# Patient Record
Sex: FEMALE | Race: WHITE | Employment: OTHER | URBAN - METROPOLITAN AREA
[De-identification: names, ages, dates, MRNs, and addresses within clinical notes are randomized per-mention and may not be internally consistent; named-entity substitution may affect disease eponyms.]

---

## 2017-05-03 ENCOUNTER — HOSPITAL ENCOUNTER (OUTPATIENT)
Dept: MAMMOGRAPHY | Age: 77
Discharge: HOME | End: 2017-05-03
Attending: INTERNAL MEDICINE | Admitting: INTERNAL MEDICINE

## 2017-05-03 DIAGNOSIS — Z12.31 ENCOUNTER FOR SCREENING MAMMOGRAM FOR BREAST CANCER: ICD-10-CM

## 2019-01-17 ENCOUNTER — HOSPITAL ENCOUNTER (OUTPATIENT)
Dept: WOMENS IMAGING | Age: 79
Discharge: HOME | End: 2019-01-17
Payer: MEDICARE

## 2019-01-17 DIAGNOSIS — Z12.31 ENCOUNTER FOR SCREENING MAMMOGRAM FOR BREAST CANCER: ICD-10-CM

## 2019-01-17 PROCEDURE — 77063 BREAST TOMOSYNTHESIS BI: CPT

## 2020-02-05 ENCOUNTER — HOSPITAL ENCOUNTER (OUTPATIENT)
Dept: WOMENS IMAGING | Age: 80
Discharge: HOME | End: 2020-02-05
Payer: MEDICARE

## 2020-02-05 PROCEDURE — 77063 BREAST TOMOSYNTHESIS BI: CPT

## 2021-02-08 ENCOUNTER — HOSPITAL ENCOUNTER (OUTPATIENT)
Dept: WOMENS IMAGING | Age: 81
Discharge: HOME | End: 2021-02-08
Payer: MEDICARE

## 2021-02-08 DIAGNOSIS — Z12.31 VISIT FOR SCREENING MAMMOGRAM: ICD-10-CM

## 2021-02-08 PROCEDURE — 77063 BREAST TOMOSYNTHESIS BI: CPT

## 2021-09-09 ENCOUNTER — HOSPITAL ENCOUNTER (OUTPATIENT)
Dept: GENERAL RADIOLOGY | Age: 81
Discharge: HOME | End: 2021-09-09
Payer: MEDICARE

## 2021-09-09 DIAGNOSIS — R05.9 COUGH: ICD-10-CM

## 2021-09-09 PROCEDURE — 71046 X-RAY EXAM CHEST 2 VIEWS: CPT

## 2021-11-01 ENCOUNTER — APPOINTMENT (OUTPATIENT)
Dept: PHYSICAL THERAPY | Age: 81
End: 2021-11-01
Payer: MEDICARE

## 2021-11-09 ENCOUNTER — HOSPITAL ENCOUNTER (OUTPATIENT)
Dept: PHYSICAL THERAPY | Age: 81
Setting detail: THERAPIES SERIES
Discharge: HOME | End: 2021-11-09
Payer: MEDICARE

## 2021-11-09 PROCEDURE — 97162 PT EVAL MOD COMPLEX 30 MIN: CPT

## 2021-11-09 PROCEDURE — 97140 MANUAL THERAPY 1/> REGIONS: CPT

## 2021-11-09 ASSESSMENT — PAIN SCALES - GENERAL: PAINLEVEL_OUTOF10: 0

## 2021-11-09 NOTE — PROGRESS NOTES
Outpatient Physical Therapy  Phone: [phone] Fax: [phone]     To: Dr. David Reyes     From: Olesya Young PT     Patient: Cedrick Arellano      : 1940  Diagnosis: Hip Pain      Date: 2021  Treatment Diagnosis:      Physical Therapy Certification/Re-Certification Form  Dear Dr. Jack Urbina,  The following patient has been evaluated for physical therapy services and for therapy to continue, Medicare requires monthly physician review of the treatment plan. Please review the attached evaluation and/or summary of the patient's plan of care, and verify that you agree therapy should continue by signing the attached document and sending it back to our office. Plan of Care/Treatment to date:  [x] Therapeutic Exercise   [] Modalities:  [x] Therapeutic Activity     [] Ultrasound  [] Electrical Stimulation   [x] Gait Training      [] Cervical Traction [] Lumbar Traction  [x] Neuromuscular Re-education  [] Hot/Coldpack [] Iontophoresis    [x] Instruction in HEP      Other:  [x] Manual Therapy       []                        [] Aquatic Therapy       []                      ? Frequency/Duration:  # Days per week: [] 1 day # Weeks: [] 1 week [] 5 weeks      [x] 2 days? [] 2 weeks [x] 6 weeks     [] 3 days   [] 3 weeks [] 7 weeks     [] 4 days   [] 4 weeks [] 8 weeks    Rehab Potential: [] Excellent [x] Good [] Fair  [] Poor       Electronically signed by:  Olesya Young PT      If you have any questions or concerns, please don't hesitate to call.   Thank you for your referral.    Physician Signature:________________________________Date:__________________  By signing above, therapists plan is approved by physician

## 2021-11-09 NOTE — PROGRESS NOTES
Physical Therapy  Initial Assessment  Date: 2021  Patient Name: Eduard Thornton  MRN: [id number]  : 1940    Subjective   General  Chart Reviewed: Yes  Patient assessed for rehabilitation services?: Yes  Additional Pertinent Hx: No bone density issues noted. Family / Caregiver Present: No  Referring Practitioner: Yuniel Guevara  Referral Date : 10/06/21  Diagnosis: Hip Pain  Follows Commands: Within Functional Limits  General Comment  Comments: PLOF:  Full functional activity without limits to pain. Currently, 80-85% pLOF. PT Visit Information  PT Insurance Information: Medicare  Subjective  Subjective: Pt reports pain in the R anterior hip/groin. No issues walking but getting in/out of the car is painful. \"Forward/backward no problem, but sideways with the R Leg is painful\". Pain has progressively worsened over the past few months but slightly better the past 1-2 weeks. No issues with the LB. Pt states the main complaint is 'slowness' with transfers especially getting in/out of the car. Up/down steps is painful especially with R LE first.  In/out of bed is good with R LE first.  Lifting the R LE is the most painful action. Increased pain with rolling over on the hip. No problems standing. Sitting is ok but sit/stand is painful.  x-rays of the hip (+) mild arthritis. Denies N/T in the LEs. Weakness noted in the R LE. Feels 'unsteady' with walking and feels it may give out on her. Pain Screening  Patient Currently in Pain: Yes  Pain Assessment  Pain Assessment: 0-10  Pain Level: 0 (at worst 6-7/10)  Vital Signs  Patient Currently in Pain: Yes    Objective     Observation/Palpation  Posture: Good  Palpation: Increased tenderness R iliopsoas. Observation: Standing: Decreased WB on R wtih slight knee flexion. Increased R hip ER,  R pelvic rotation. Sway back with decreased gluteal and core contraction. Body Mechanics: SLS R with trendelenberg, hip IR and pelvic rotation.  Squat test: quad dominance with knee valgus. SLS squat B hip iR, knee valgus, quad dominance, LOB. AMB: B hip IR, knee valgus, trendelenberg, ERS lumbar spine, decreased stride length and femoral amb. PROM RLE (degrees)  RLE PROM: WNL  AROM RLE (degrees)  RLE AROM: WNL  PROM LLE (degrees)  LLE PROM: WNL  AROM LLE (degrees)  LLE AROM : WNL  Spine  Lumbar: Full lumbar flexion, SB, rotation and extension without pain. Joint Mobility  ROM RLE: AGMR R hip joint. Hypomobility R hip joint. Strength RLE  Strength RLE: WNL  Comment: Hip ER 4-/5; Hip IR 4/5; PGM 3-/5  Strength LLE  Strength LLE: WNL  Comment: Hip ER 4-/5; Hip IR 4/5; PGM 3-/5     Additional Measures  Flexibility: R hip IR 5 degrees, L 10 degrees. R hip ER 20 degrees. Decreased hamstring length B. Decreased R hip flexor length  Special Tests: (+) non-relevant R slump test.  Other: AGMR R hip joint. (+) R lesvia test.    Assessment   Conditions Requiring Skilled Therapeutic Intervention  Assessment: Pt presents with movement impairments AGMR R hip and ERS lumbar spine. Significant ROM deficits and decreased gluteal strength in the R hip.   Prognosis: Good  Decision Making: Medium Complexity  REQUIRES PT FOLLOW UP: Yes         Plan   Plan  Times per week: 2x/wk for 6 weeks  Current Treatment Recommendations: Strengthening, Neuromuscular Re-education, Home Exercise Program, ROM, Manual Therapy - Soft Tissue Mobilization, Endurance Training, Patient/Caregiver Education & Training, Manual Therapy - Joint Manipulation, Transfer Training, Gait Training    G-Code  LEFS   49% disability     Goals  Long term goals  Time Frame for Long term goals : 6 weeks  Long term goal 1: Pt independent with HEP  Long term goal 2: Pt gluteal strength to improve by 1/3 muscle grade  Long term goal 3: R hip IR/ER ROM to improve by 50%  Long term goal 4: Pt sit/stand without pain or hip stiffness  Long term goal 5: Pt return to PLOF       Therapy Time   Individual Concurrent Group Co-treatment   Time In  1:45         Time Out  2:30         Minutes  15                 Monique Leija, [address]

## 2021-11-09 NOTE — FLOWSHEET NOTE
[facility]. [facility] Therapy, [facility], [address] [facility], [address] [place] [address]  Phone: [phone]  Fax [phone]    Physical Therapy Daily Treatment Note    Date:  2021    Patient Name:  Trent Beltran    :  1940  MRN: [id number]  Restrictions/Precautions:  Reports no bone density  Medical/Treatment Diagnosis Information:  · Diagnosis: Hip Pain  Insurance/Certification information:  PT Insurance Information: Medicare  Physician Information:  Referring Practitioner: Aissatou Ornelas  Plan of care signed (Y/N):  N  Visit# / total visits:      G-Code (if applicable):          LEFS  49% disability     Medicare Cap (if applicable):  201 = total amount used, updated 2021    Time in:   1:45      Timed Treatment: 15 Total Treatment Time:  45  Time out: 2:30  ________________________________________________________________________________________    Pain Level:    2/10  SUBJECTIVE:  See initial evaluation     OBJECTIVE:     Exercise/Equipment Resistance/Repetitions Other comments          TA sets    NV     Clamshells NV     Prone hip extension NV     Bridging NV     DKTC with feet on TB  NV                                           R hip IR/ER neuro re-ed   NV                                                        Other Therapeutic Activities:      Manual Treatments:  Gr 4 LAD with straps, compression, oscillation. Femoral MWM R hip flexion. R hip hit technique. NV hip IR/ER neuro re-ed resisted training.         Modalities:      Test/Measurements:         ASSESSMENT:         Treatment/Activity Tolerance:   [x]Patient tolerated treatment well [] Patient limited by fatique  []Patient limited by pain [] Patient limited by other medical complications  [] Other:     Goals:        Long term goals  Time Frame for Long term goals : 6 weeks  Long term goal 1: Pt independent with HEP  Long term goal 2: Pt gluteal strength to improve by 1/3 muscle grade  Long term goal 3: R hip IR/ER ROM to improve by 50%  Long term goal 4: Pt sit/stand without pain or hip stiffness  Long term goal 5: Pt return to PLOF     Plan: [x] Continue per plan of care [] Alter current plan (see comments)   [x] Plan of care initiated [] Hold pending MD visit [] Discharge      Plan for Next Session:      Re-Certification Due Date:         Signature:  Tom Sommers PT

## 2021-11-11 ENCOUNTER — HOSPITAL ENCOUNTER (OUTPATIENT)
Dept: PHYSICAL THERAPY | Age: 81
Setting detail: THERAPIES SERIES
Discharge: HOME | End: 2021-11-11
Payer: MEDICARE

## 2021-11-11 PROCEDURE — 97112 NEUROMUSCULAR REEDUCATION: CPT

## 2021-11-11 PROCEDURE — 97140 MANUAL THERAPY 1/> REGIONS: CPT

## 2021-11-11 PROCEDURE — 97110 THERAPEUTIC EXERCISES: CPT

## 2021-11-11 NOTE — FLOWSHEET NOTE
[facility], [facility], [address] [place] [address] Dr  Phone: [phone]  Fax [phone]    Physical Therapy Daily Treatment Note    Date:  2021    Patient Name:  Eloina Rizvi    :  1940  MRN: [id number]  Restrictions/Precautions:  Reports no bone density  Medical/Treatment Diagnosis Information:  · Diagnosis: Hip Pain  Insurance/Certification information:  PT Insurance Information: Medicare  Physician Information:  Referring Practitioner: Jolene Medeiros  Plan of care signed (Y/N):  N  Visit# / total visits:      G-Code (if applicable):          LEFS  49% disability     Medicare Cap (if applicable):  943 = total amount used, updated 2021    Time in:   12:45      Timed Treatment: 15 Total Treatment Time:  45  Time out:  1:30  ________________________________________________________________________________________    Pain Level:    0/10  SUBJECTIVE:   By end of session today, patient able to cross her R leg to don her shoe. \"I havent been able to do that for years'. Pt reports pain in the R anterior hip/groin. No issues walking but getting in/out of the car is painful. \"Forward/backward no problem, but sideways with the R Leg is painful\". Pain has progressively worsened over the past few months but slightly better the past 1-2 weeks. No issues with the LB. Pt states the main complaint is 'slowness' with transfers especially getting in/out of the car. Up/down steps is painful especially with R LE first.  In/out of bed is good with R LE first.  Lifting the R LE is the most painful action. Increased pain with rolling over on the hip. No problems standing. Sitting is ok but sit/stand is painful.  x-rays of the hip (+) mild arthritis. Denies N/T in the LEs. Weakness noted in the R LE. Feels 'unsteady' with walking and feels it may give out on her.     OBJECTIVE:     Exercise/Equipment Resistance/Repetitions Other comments          TA sets BP cuff        With march Until achieved  x10 B HEP video    Clamshells 6x5 secs B   HEP video    Prone hip extension 10x5 secs B  HEP video    Bridging 10x5 secs  HEP video    DKTC with feet on TB  x30                                          B hip IR/ER neuro re-ed   x5 mins B                    TG    x6 mins                                   Other Therapeutic Activities:      Manual Treatments:  Gr 4 LAD with straps, compression, oscillation. Femoral MWM B hip flexion. B hip hit technique. B hip IR/ER neuro re-ed resisted training. Modalities:      Test/Measurements:    R hip IR 5 degrees, L 10 degrees. R hip ER 20 degrees. Decreased hamstring length B. Decreased R hip flexor length  Special Tests: (+) non-relevant R slump test.  Other: AGMR R hip joint. (+) R lesvia test.       ASSESSMENT:   Pt responding well to manual therapy with significant R hip joint mobility. Initiated gluteal and core strengthening today.        Treatment/Activity Tolerance:   [x]Patient tolerated treatment well [] Patient limited by fatique  []Patient limited by pain [] Patient limited by other medical complications  [] Other:     Goals:        Long term goals  Time Frame for Long term goals : 6 weeks  Long term goal 1: Pt independent with HEP  Long term goal 2: Pt gluteal strength to improve by 1/3 muscle grade  Long term goal 3: R hip IR/ER ROM to improve by 50%  Long term goal 4: Pt sit/stand without pain or hip stiffness  Long term goal 5: Pt return to PLOF     Plan: [x] Continue per plan of care [] Alter current plan (see comments)   [] Plan of care initiated [] Hold pending MD visit [] Discharge      Plan for Next Session:      Re-Certification Due Date:         Signature:  Jasmina Limon PT

## 2021-11-17 ENCOUNTER — HOSPITAL ENCOUNTER (OUTPATIENT)
Dept: PHYSICAL THERAPY | Age: 81
Setting detail: THERAPIES SERIES
Discharge: HOME | End: 2021-11-17
Payer: MEDICARE

## 2021-11-17 PROCEDURE — 97140 MANUAL THERAPY 1/> REGIONS: CPT

## 2021-11-17 PROCEDURE — 97110 THERAPEUTIC EXERCISES: CPT

## 2021-11-17 PROCEDURE — 97112 NEUROMUSCULAR REEDUCATION: CPT

## 2021-11-17 NOTE — FLOWSHEET NOTE
[facility]. and Therapy, [address]  Phone: [phone]  Fax [phone]    Physical Therapy Daily Treatment Note    Date:  2021    Patient Name:  Hans Jones    :  1940  MRN: [id number]  Restrictions/Precautions:  Reports no bone density  Medical/Treatment Diagnosis Information:  · Diagnosis: Hip Pain  Insurance/Certification information:  PT Insurance Information: Medicare  Physician Information:  Referring Practitioner: Jamari Torres  Plan of care signed (Y/N):  N  Visit# / total visits:  3/12    G-Code (if applicable):          LEFS  49% disability     Medicare Cap (if applicable):  930 = total amount used, updated 2021    Time in:   11:30      Timed Treatment: 45 Total Treatment Time:  45  Time out:  12:15  ________________________________________________________________________________________    Pain Level:    0/10  SUBJECTIVE:   Pt continues to be able to cross her R leg over the L since beginning therapy. Significant improvement in condition. Pt reports pain in the R anterior hip/groin. No issues walking but getting in/out of the car is painful. \"Forward/backward no problem, but sideways with the R Leg is painful\". Pain has progressively worsened over the past few months but slightly better the past 1-2 weeks. No issues with the LB. Pt states the main complaint is 'slowness' with transfers especially getting in/out of the car. Up/down steps is painful especially with R LE first.  In/out of bed is good with R LE first.  Lifting the R LE is the most painful action. Increased pain with rolling over on the hip. No problems standing. Sitting is ok but sit/stand is painful.  x-rays of the hip (+) mild arthritis. Denies N/T in the LEs. Weakness noted in the R LE. Feels 'unsteady' with walking and feels it may give out on her.     OBJECTIVE:     Exercise/Equipment Resistance/Repetitions Other comments          TA sets BP cuff        With march       With KFO  Until achieved  x10 B  x10 B HEP video    Clamshells x10 B green TB    HEP video    Prone hip extension   HEP video    Bridging 10x5 secs  HEP video    DKTC with feet on TB  x30                  Stacking with MB   NV     Rockerboard  NV            B hip IR/ER neuro re-ed   x5 mins B                    TG    x6 mins                                   Other Therapeutic Activities:      Manual Treatments:  Gr 4 LAD with straps, compression, oscillation. Femoral MWM B hip flexion. R hip hit technique. R hip IR/ER neuro re-ed resisted training. Prone R hip mobilizations. Modalities:      Test/Measurements:    R hip IR 5 degrees, L 10 degrees. R hip ER 20 degrees. Decreased hamstring length B. Decreased R hip flexor length  Special Tests: (+) non-relevant R slump test.  Other: AGMR R hip joint. (+) R lesvia test.       ASSESSMENT:   Pt responding well to manual therapy with significant R hip joint mobility. Initiated gluteal and core strengthening today.        Treatment/Activity Tolerance:   [x]Patient tolerated treatment well [] Patient limited by fatique  []Patient limited by pain [] Patient limited by other medical complications  [] Other:     Goals:        Long term goals  Time Frame for Long term goals : 6 weeks  Long term goal 1: Pt independent with HEP  Long term goal 2: Pt gluteal strength to improve by 1/3 muscle grade  Long term goal 3: R hip IR/ER ROM to improve by 50%  Long term goal 4: Pt sit/stand without pain or hip stiffness  Long term goal 5: Pt return to PLOF     Plan: [x] Continue per plan of care [] Alter current plan (see comments)   [] Plan of care initiated [] Hold pending MD visit [] Discharge      Plan for Next Session:      Re-Certification Due Date:         Signature:  Olesya Young, LITZY

## 2021-11-19 ENCOUNTER — HOSPITAL ENCOUNTER (OUTPATIENT)
Dept: PHYSICAL THERAPY | Age: 81
Setting detail: THERAPIES SERIES
Discharge: HOME | End: 2021-11-19
Payer: MEDICARE

## 2021-11-19 PROCEDURE — 97112 NEUROMUSCULAR REEDUCATION: CPT

## 2021-11-19 PROCEDURE — 97110 THERAPEUTIC EXERCISES: CPT

## 2021-11-19 PROCEDURE — 97140 MANUAL THERAPY 1/> REGIONS: CPT

## 2021-11-19 NOTE — FLOWSHEET NOTE
[facility], [facility], [address] [place] [address], [address] [place] [address] Dr  Phone: [phone]  Fax [phone]    Physical Therapy Daily Treatment Note    Date:  2021    Patient Name:  Maicol Rodas    :  1940  MRN: [id number]  Restrictions/Precautions:  Reports no bone density  Medical/Treatment Diagnosis Information:  · Diagnosis: Hip Pain  Insurance/Certification information:  PT Insurance Information: Medicare  Physician Information:  Referring Practitioner: Renuka Leblanc  Plan of care signed (Y/N):  N  Visit# / total visits:      G-Code (if applicable):          LEFS  49% disability     Medicare Cap (if applicable):  228 = total amount used, updated 2021    Time in:   10:45      Timed Treatment: 45 Total Treatment Time:  45  Time out:  11:30  ________________________________________________________________________________________    Pain Level:    0/10  SUBJECTIVE:   Pt continues to be able to cross her R leg over the L since beginning therapy. Significant improvement in condition. Pt reports pain in the R anterior hip/groin. No issues walking but getting in/out of the car is painful. \"Forward/backward no problem, but sideways with the R Leg is painful\". Pain has progressively worsened over the past few months but slightly better the past 1-2 weeks. No issues with the LB. Pt states the main complaint is 'slowness' with transfers especially getting in/out of the car. Up/down steps is painful especially with R LE first.  In/out of bed is good with R LE first.  Lifting the R LE is the most painful action. Increased pain with rolling over on the hip. No problems standing. Sitting is ok but sit/stand is painful.  x-rays of the hip (+) mild arthritis. Denies N/T in the LEs. Weakness noted in the R LE. Feels 'unsteady' with walking and feels it may give out on her.     OBJECTIVE:     Exercise/Equipment Resistance/Repetitions Other comments          TA sets         With march       With KFO  10x5 secs  HEP video    Clamshells x10 B green TB    HEP video    Prone hip extension   HEP video    Bridging 10x5 secs with green TB   HEP video    DKTC with feet on TB  x30                  Stacking with MB   2x30 secs B   HEP    Rockerboard  x1 min F/B M/L  x1 min rocking each             B hip IR/ER neuro re-ed   x5 mins B                    TG    x6 mins                                   Other Therapeutic Activities:      Manual Treatments:  Gr 4 LAD with straps, compression, oscillation. Gr 3-4 lateral distraction R hip joint MWM. Femoral MWM B hip flexion. R hip hit technique. R hip IR/ER neuro re-ed resisted training. Prone R hip mobilizations. Manual stretching hip flexion and ER position. Modalities:      Test/Measurements:   Supine R hip flexion  140 degrees    R hip IR  15 degrees   R hip ER  45 degrees      Special Tests: (+) non-relevant R slump test.  Other: AGMR R hip joint. (+) R lesvia test.       ASSESSMENT:   Pt responding well to manual therapy with significant R hip joint mobility. Initiated gluteal and core strengthening today.        Treatment/Activity Tolerance:   [x]Patient tolerated treatment well [] Patient limited by fatique  []Patient limited by pain [] Patient limited by other medical complications  [] Other:     Goals:        Long term goals  Time Frame for Long term goals : 6 weeks  Long term goal 1: Pt independent with HEP  Long term goal 2: Pt gluteal strength to improve by 1/3 muscle grade  Long term goal 3: R hip IR/ER ROM to improve by 50%  Long term goal 4: Pt sit/stand without pain or hip stiffness  Long term goal 5: Pt return to PLOF     Plan: [x] Continue per plan of care [] Alter current plan (see comments)   [] Plan of care initiated [] Hold pending MD visit [] Discharge      Plan for Next Session:      Re-Certification Due Date:         Signature:  Carlos Tillman PT

## 2021-11-22 ENCOUNTER — HOSPITAL ENCOUNTER (OUTPATIENT)
Dept: PHYSICAL THERAPY | Age: 81
Setting detail: THERAPIES SERIES
Discharge: HOME | End: 2021-11-22
Payer: MEDICARE

## 2021-11-22 PROCEDURE — 97140 MANUAL THERAPY 1/> REGIONS: CPT

## 2021-11-22 PROCEDURE — 97110 THERAPEUTIC EXERCISES: CPT

## 2021-11-22 PROCEDURE — 97112 NEUROMUSCULAR REEDUCATION: CPT

## 2021-11-22 NOTE — FLOWSHEET NOTE
[facility], [facility], [address], [address]  Phone: [phone]  Fax [phone]    Physical Therapy Daily Treatment Note    Date:  2021    Patient Name:  Robert Ames    :  1940  MRN: [id number]  Restrictions/Precautions:  Reports no bone density  Medical/Treatment Diagnosis Information:  · Diagnosis: Hip Pain  Insurance/Certification information:  PT Insurance Information: Medicare  Physician Information:  Referring Practitioner: Khadijah Zhang  Plan of care signed (Y/N):  N  Visit# / total visits:      G-Code (if applicable):          LEFS  49% disability     Medicare Cap (if applicable):  819 = total amount used, updated 2021    Time in:   1:00      Timed Treatment: 45 Total Treatment Time:  45  Time out:  1:45  ________________________________________________________________________________________    Pain Level:    0/10  SUBJECTIVE:   Pt continues to be able to cross her R leg over the L since beginning therapy. Significant improvement in condition. Pt reports pain in the R anterior hip/groin. No issues walking but getting in/out of the car is painful. \"Forward/backward no problem, but sideways with the R Leg is painful\". Pain has progressively worsened over the past few months but slightly better the past 1-2 weeks. No issues with the LB. Pt states the main complaint is 'slowness' with transfers especially getting in/out of the car. Up/down steps is painful especially with R LE first.  In/out of bed is good with R LE first.  Lifting the R LE is the most painful action. Increased pain with rolling over on the hip. No problems standing. Sitting is ok but sit/stand is painful.  x-rays of the hip (+) mild arthritis. Denies N/T in the LEs. Weakness noted in the R LE. Feels 'unsteady' with walking and feels it may give out on her.     OBJECTIVE:     Exercise/Equipment Resistance/Repetitions Other comments          TA sets         With march       With KFO  10x5 secs  HEP video    Clamshells x10 B green TB alt    HEP    Prone hip extension   HEP video    Bridging 10x5 secs with green TB   HEP video    DKTC with feet on TB      Clamshells 2   6x5 secs B  HEP video           Stacking with MB   2x30 secs B   HEP    Rockerboard  x1 min F/B M/L  x1 min rocking each     Lateral stepping with green TB       Steamboats      15ft x6  x15 B     B hip IR/ER neuro re-ed   x4 mins B             S' extension   x15 B maroon TB     Multifidus  x15 B maroon TB            TG with MWM   x8 mins Manual stretching, wide BEULAH, R leg over L stretching   TG    x6 mins              Other Therapeutic Activities:      Manual Treatments:  Gr 4 LAD with straps, compression, oscillation. Gr 3-4 lateral distraction R hip joint MWM with squatting on TG. Femoral MWM B hip flexion. R hip hit technique. R hip IR/ER neuro re-ed resisted training. Prone R hip mobilizations. Manual stretching hip flexion and ER position. Modalities:      Test/Measurements:   Supine R hip flexion  140 degrees    R hip IR  15 degrees   R hip ER  45 degrees      Special Tests: (+) non-relevant R slump test.  Other: AGMR R hip joint. (+) R lesvia test.       ASSESSMENT:   Pt responding well to manual therapy with significant R hip joint mobility. Initiated gluteal and core strengthening today.        Treatment/Activity Tolerance:   [x]Patient tolerated treatment well [] Patient limited by fatique  []Patient limited by pain [] Patient limited by other medical complications  [] Other:     Goals:        Long term goals  Time Frame for Long term goals : 6 weeks  Long term goal 1: Pt independent with HEP  Long term goal 2: Pt gluteal strength to improve by 1/3 muscle grade  Long term goal 3: R hip IR/ER ROM to improve by 50%  Long term goal 4: Pt sit/stand without pain or hip stiffness  Long term goal 5: Pt return to PLOF Plan: [x] Continue per plan of care [] Alter current plan (see comments)   [] Plan of care initiated [] Hold pending MD visit [] Discharge      Plan for Next Session:      Re-Certification Due Date:         Signature:  Olesya Young PT

## 2021-11-24 ENCOUNTER — HOSPITAL ENCOUNTER (OUTPATIENT)
Dept: PHYSICAL THERAPY | Age: 81
Setting detail: THERAPIES SERIES
Discharge: HOME | End: 2021-11-24
Payer: MEDICARE

## 2021-11-24 PROCEDURE — 97110 THERAPEUTIC EXERCISES: CPT

## 2021-11-24 PROCEDURE — 97112 NEUROMUSCULAR REEDUCATION: CPT

## 2021-11-24 PROCEDURE — 97140 MANUAL THERAPY 1/> REGIONS: CPT

## 2021-11-24 NOTE — FLOWSHEET NOTE
[facility], [facility], [address] [place] [address], [address] [place] [address]  Phone: [phone]  Fax [phone]    Physical Therapy Daily Treatment Note    Date:  2021    Patient Name:  Robert Ames    :  1940  MRN: [id number]  Restrictions/Precautions:  Reports no bone density  Medical/Treatment Diagnosis Information:  · Diagnosis: Hip Pain  Insurance/Certification information:  PT Insurance Information: Medicare  Physician Information:  Referring Practitioner: Khadijah Zhang  Plan of care signed (Y/N):  N  Visit# / total visits:      G-Code (if applicable):          LEFS  49% disability     Medicare Cap (if applicable):  826 = total amount used, updated 2021    Time in:   1:00      Timed Treatment: 45 Total Treatment Time:  45  Time out:  1:45  ________________________________________________________________________________________    Pain Level:    0/10  SUBJECTIVE:   Pt continues to report significant improvement in condition. Pt reports pain in the R anterior hip/groin. No issues walking but getting in/out of the car is painful. \"Forward/backward no problem, but sideways with the R Leg is painful\". Pain has progressively worsened over the past few months but slightly better the past 1-2 weeks. No issues with the LB. Pt states the main complaint is 'slowness' with transfers especially getting in/out of the car. Up/down steps is painful especially with R LE first.  In/out of bed is good with R LE first.  Lifting the R LE is the most painful action. Increased pain with rolling over on the hip. No problems standing. Sitting is ok but sit/stand is painful.  x-rays of the hip (+) mild arthritis. Denies N/T in the LEs. Weakness noted in the R LE. Feels 'unsteady' with walking and feels it may give out on her.     OBJECTIVE:     Exercise/Equipment Resistance/Repetitions Other comments   3 way ball compression 2x30 secs forward  10x5 secs diagonally    TA sets         With march       With KFO    HEP video    Clamshells x10 B green TB alt    HEP    Prone hip extension   HEP video    Bridging 10x5 secs with green TB   HEP video    DKTC with feet on TB      Clamshells 2     HEP video           Stacking with MB   2x30 secs B   HEP    Rockerboard  x1 min F/B M/L  x1 min rocking each     Lateral stepping with green TB       Steamboats      15ft x8  x15 B     B hip IR/ER neuro re-ed   x4 mins B             S' extension   x15 B maroon TB     Multifidus  x15 B maroon TB     Mini squats    x10  Emphasis on form   TG with MWM   x8 mins Manual stretching, wide BEULAH, R leg over L stretching   TG    x6 mins              Other Therapeutic Activities:      Manual Treatments:  Gr 4 LAD with straps, compression, oscillation. Gr 3-4 lateral distraction R hip joint MWM with squatting on TG. Femoral MWM B hip flexion. R hip hit technique. R hip IR/ER neuro re-ed resisted training. Prone R hip mobilizations. Manual stretching hip flexion and ER position. Modalities:      Test/Measurements:   Supine R hip flexion  140 degrees    R hip IR  15 degrees   R hip ER  45 degrees      Special Tests: (+) non-relevant R slump test.  Other: AGMR R hip joint. (+) R lesvia test.       ASSESSMENT:   Pt responding well to manual therapy with significant R hip joint mobility. Progressed gluteal and core strengthening today as well as movement training.        Treatment/Activity Tolerance:   [x]Patient tolerated treatment well [] Patient limited by fatique  []Patient limited by pain [] Patient limited by other medical complications  [] Other:     Goals:        Long term goals  Time Frame for Long term goals : 6 weeks  Long term goal 1: Pt independent with HEP  Long term goal 2: Pt gluteal strength to improve by 1/3 muscle grade  Long term goal 3: R hip IR/ER ROM to improve by 50%  Long term goal 4: Pt sit/stand without pain or hip stiffness  Long term goal 5: Pt return to PLOF     Plan: [x] Continue per plan of care [] Alter current plan (see comments)   [] Plan of care initiated [] Hold pending MD visit [] Discharge      Plan for Next Session:      Re-Certification Due Date:         Signature:  Carlos Tillman PT

## 2021-11-29 ENCOUNTER — HOSPITAL ENCOUNTER (OUTPATIENT)
Dept: PHYSICAL THERAPY | Age: 81
Setting detail: THERAPIES SERIES
Discharge: HOME | End: 2021-11-29
Payer: MEDICARE

## 2021-11-29 PROCEDURE — 97112 NEUROMUSCULAR REEDUCATION: CPT

## 2021-11-29 PROCEDURE — 97140 MANUAL THERAPY 1/> REGIONS: CPT

## 2021-11-29 PROCEDURE — 97110 THERAPEUTIC EXERCISES: CPT

## 2021-11-29 NOTE — FLOWSHEET NOTE
[facility], [address]  Phone: [phone]  Fax [phone]    Physical Therapy Daily Treatment Note    Date:  2021    Patient Name:  Wendy Rivera    :  1940  MRN: [id number]  Restrictions/Precautions:  Reports no bone density  Medical/Treatment Diagnosis Information:  · Diagnosis: Hip Pain  Insurance/Certification information:  PT Insurance Information: Medicare  Physician Information:  Referring Practitioner: Deven Reyes  Plan of care signed (Y/N):  N  Visit# / total visits:      G-Code (if applicable):          LEFS  49% disability     Medicare Cap (if applicable):  887 = total amount used, updated 2021    Time in:   1:00      Timed Treatment: 45 Total Treatment Time:  45  Time out:  1:45  ________________________________________________________________________________________    Pain Level:    0/10  SUBJECTIVE:   Pt continues to report significant improvement in condition. Pt notes doing really well with pain at times. Improvement with getting out of bed and in/out of the car. Pt reports pain in the R anterior hip/groin. No issues walking but getting in/out of the car is painful. \"Forward/backward no problem, but sideways with the R Leg is painful\". Pain has progressively worsened over the past few months but slightly better the past 1-2 weeks. No issues with the LB. Pt states the main complaint is 'slowness' with transfers especially getting in/out of the car. Up/down steps is painful especially with R LE first.  In/out of bed is good with R LE first.  Lifting the R LE is the most painful action. Increased pain with rolling over on the hip. No problems standing. Sitting is ok but sit/stand is painful.  x-rays of the hip (+) mild arthritis. Denies N/T in the LEs. Weakness noted in the R LE.   Feels 'unsteady' with walking and feels it may give out on her.    OBJECTIVE:     Exercise/Equipment Resistance/Repetitions Other comments   3 way ball compression   2x30 secs forward  10x5 secs diagonally    TA sets         With march       With KFO    HEP video    Clamshells     HEP    Prone hip extension   HEP video    Bridging 10x5 secs with green TB   HEP video    DKTC with feet on TB      Clamshells 2     HEP video           Stacking with MB   2x30 secs B   HEP    Rockerboard  x1 min F/B M/L  x1 min rocking each     Lateral stepping with green TB       Steamboats      15ft x8  x15 B     R hip IR/ER neuro re-ed   x2 mins R             S' extension   x15 B maroon TB     Multifidus  x15 B maroon TB     Mini squats    x10  Emphasis on form   TG with MWM   x8 mins Manual stretching, wide BEULAH, R leg over L stretching   TG    x6 mins              Other Therapeutic Activities:      Manual Treatments:  Gr 4 LAD with straps, compression, oscillation. Gr 3-4 lateral distraction R hip joint MWM with squatting on TG. Femoral MWM B hip flexion. R hip hit technique. R hip IR/ER neuro re-ed resisted training. Prone R hip mobilizations. Manual stretching hip flexion and ER position. Modalities:      Test/Measurements:   Supine R hip flexion  140 degrees    R hip IR  15 degrees   R hip ER  45 degrees      Special Tests: (+) non-relevant R slump test.  Other: AGMR R hip joint. (+) R lesvia test.       ASSESSMENT:   Pt responding well to manual therapy with significant R hip joint mobility. Progressed gluteal and core strengthening today as well as movement training.        Treatment/Activity Tolerance:   [x]Patient tolerated treatment well [] Patient limited by fatique  []Patient limited by pain [] Patient limited by other medical complications  [] Other:     Goals:        Long term goals  Time Frame for Long term goals : 6 weeks  Long term goal 1: Pt independent with HEP  Long term goal 2: Pt gluteal strength to improve by 1/3 muscle grade  Long term goal 3: R hip IR/ER ROM to improve by 50%  Long term goal 4: Pt sit/stand without pain or hip stiffness  Long term goal 5: Pt return to PLOF     Plan: [x] Continue per plan of care [] Alter current plan (see comments)   [] Plan of care initiated [] Hold pending MD visit [] Discharge      Plan for Next Session:      Re-Certification Due Date:         Signature:  Bert Mirza PT

## 2021-12-01 ENCOUNTER — APPOINTMENT (OUTPATIENT)
Dept: PHYSICAL THERAPY | Age: 81
End: 2021-12-01
Payer: MEDICARE

## 2021-12-02 ENCOUNTER — HOSPITAL ENCOUNTER (OUTPATIENT)
Dept: PHYSICAL THERAPY | Age: 81
Setting detail: THERAPIES SERIES
Discharge: HOME | End: 2021-12-02
Payer: MEDICARE

## 2021-12-02 PROCEDURE — 97110 THERAPEUTIC EXERCISES: CPT

## 2021-12-02 PROCEDURE — 97140 MANUAL THERAPY 1/> REGIONS: CPT

## 2021-12-02 PROCEDURE — 97112 NEUROMUSCULAR REEDUCATION: CPT

## 2021-12-02 NOTE — FLOWSHEET NOTE
Emma  78. and Therapy, [facility], [address] [place] [address]  Phone: [phone]  Fax [phone]    Physical Therapy Daily Treatment Note    Date:  2021    Patient Name:  Nancy Cullen    :  1940  MRN: [id number]  Restrictions/Precautions:  Reports no bone density  Medical/Treatment Diagnosis Information:  · Diagnosis: Hip Pain  Insurance/Certification information:  PT Insurance Information: Medicare  Physician Information:  Referring Practitioner: Bartolo Blake  Plan of care signed (Y/N):  N  Visit# / total visits:      G-Code (if applicable):          LEFS   21 23% disability    At initial evaluation  49% disability     Medicare Cap (if applicable):  987 = total amount used, updated 2021    Time in:   9:45      Timed Treatment: 45 Total Treatment Time:  45  Time out:  10:30  ________________________________________________________________________________________    Pain Level:    0/10  SUBJECTIVE:   Pt continues to report significant improvement in condition. Improvement with getting out of bed and in/out of the car. When she does get pain it is short lived and minimal intensity. Pt comfortable with reducing PT frequency to 1x/wk. Pt reports pain in the R anterior hip/groin. No issues walking but getting in/out of the car is painful. \"Forward/backward no problem, but sideways with the R Leg is painful\". Pain has progressively worsened over the past few months but slightly better the past 1-2 weeks. No issues with the LB. Pt states the main complaint is 'slowness' with transfers especially getting in/out of the car. Up/down steps is painful especially with R LE first.  In/out of bed is good with R LE first.  Lifting the R LE is the most painful action. Increased pain with rolling over on the hip. No problems standing.   Sitting is ok but sit/stand is painful.  x-rays of the hip (+) mild Long term goals  Time Frame for Long term goals : 6 weeks  Long term goal 1: Pt independent with HEP  (met)  Long term goal 2: Pt gluteal strength to improve by 1/3 muscle grade (improved)  Long term goal 3: R hip IR/ER ROM to improve by 50%  (met)  Long term goal 4: Pt sit/stand without pain or hip stiffness  (met)  Long term goal 5: Pt return to PLOF (met)    Plan: [x] Continue per plan of care [] Alter current plan (see comments)   [] Plan of care initiated [] Hold pending MD visit [] Discharge      Plan for Next Session:      Re-Certification Due Date:         Signature:  Anupam Dey PT

## 2021-12-02 NOTE — PROGRESS NOTES
[facility], [facility][1], [address] [place] [address]  Phone: [phone]  Fax [phone]    Physical Therapy Daily Treatment Note    Date:  2021    Patient Name:  Mahin Gregg    :  1940  MRN: [id number]  Restrictions/Precautions:  Reports no bone density  Medical/Treatment Diagnosis Information:  · Diagnosis: Hip Pain  Insurance/Certification information:  PT Insurance Information: Medicare  Physician Information:  Referring Practitioner: Elicia Moreno  Plan of care signed (Y/N):  N  Visit# / total visits:      G-Code (if applicable):          LEFS   21 23% disability    At initial evaluation  49% disability     Medicare Cap (if applicable):  734 = total amount used, updated 2021    Time in:   9:45      Timed Treatment: 45 Total Treatment Time:  45  Time out:  10:30  ________________________________________________________________________________________    Pain Level:    0/10  SUBJECTIVE:   Pt continues to report significant improvement in condition. Improvement with getting out of bed and in/out of the car. When she does get pain it is short lived and minimal intensity. Pt comfortable with reducing PT frequency to 1x/wk. OBJECTIVE:   Test/Measurements:   Supine R hip flexion  130 degrees    R hip IR  15 degrees   R hip ER  45 degrees      Special Tests: (+) non-relevant R slump test.  Other: AGMR R hip joint. (+) R lesvia test.       ASSESSMENT:   Pt responding well to manual therapy with significant R hip joint mobility. Progressed gluteal and core strengthening today as well as movement training. Recommend reducing PT frequency to 1x/wk for the remainder of prescription through December.       Treatment/Activity Tolerance:   [x]Patient tolerated treatment well [] Patient limited by fatique  []Patient limited by pain [] Patient limited by other medical complications  [] Other: Goals:        Long term goals  Time Frame for Long term goals : 6 weeks  Long term goal 1: Pt independent with HEP  (met)  Long term goal 2: Pt gluteal strength to improve by 1/3 muscle grade (improved)  Long term goal 3: R hip IR/ER ROM to improve by 50%  (met)  Long term goal 4: Pt sit/stand without pain or hip stiffness  (met)  Long term goal 5: Pt return to PLOF (met)    Plan: [x] Continue per plan of care [] Alter current plan (see comments)   [] Plan of care initiated [] Hold pending MD visit [] Discharge      Plan for Next Session:      Re-Certification Due Date:         Signature:  Do David, PT

## 2021-12-06 ENCOUNTER — HOSPITAL ENCOUNTER (OUTPATIENT)
Dept: PHYSICAL THERAPY | Age: 81
Setting detail: THERAPIES SERIES
Discharge: HOME | End: 2021-12-06
Payer: MEDICARE

## 2021-12-06 NOTE — PROGRESS NOTES
Physical Therapy  Cancellation/No-show Note  Patient Name:  Jenna Thayer  :  1940   Date:  2021  Cancels to date: 1  No-shows to date: 0    For today's appointment patient:  [] Cancelled  [] Rescheduled appointment  [] No-show     Reason given by patient:  [] Patient ill  [] Conflicting appointment  [] No transportation    [] Conflict with work  [] No reason given  [] Other:     Comments:      Electronically signed by:  Rustam Chong PT

## 2021-12-08 ENCOUNTER — APPOINTMENT (OUTPATIENT)
Dept: PHYSICAL THERAPY | Age: 81
End: 2021-12-08
Payer: MEDICARE

## 2021-12-22 ENCOUNTER — HOSPITAL ENCOUNTER (OUTPATIENT)
Dept: PHYSICAL THERAPY | Age: 81
Setting detail: THERAPIES SERIES
Discharge: HOME | End: 2021-12-22
Payer: MEDICARE

## 2021-12-22 PROCEDURE — 97112 NEUROMUSCULAR REEDUCATION: CPT

## 2021-12-22 PROCEDURE — 97110 THERAPEUTIC EXERCISES: CPT

## 2021-12-22 PROCEDURE — 97140 MANUAL THERAPY 1/> REGIONS: CPT

## 2021-12-22 NOTE — FLOWSHEET NOTE
Emma  78. and Therapy, [facility], [address] [facility], [address]  Phone: [phone]  Fax [phone]    Physical Therapy Daily Treatment Note    Date:  2021    Patient Name:  Dez Cottrell    :  1940  MRN: [id number]  Restrictions/Precautions:  Reports no bone density  Medical/Treatment Diagnosis Information:  · Diagnosis: Hip Pain  Insurance/Certification information:  PT Insurance Information: Medicare  Physician Information:  Referring Practitioner: Jayshree Cotto  Plan of care signed (Y/N):  N  Visit# / total visits:      G-Code (if applicable):          LEFS   21 23% disability    At initial evaluation  49% disability     Medicare Cap (if applicable):  070 = total amount used, updated 2021    Time in:  1:55      Timed Treatment: 35 Total Treatment Time:  35  Time out:  2:30  ________________________________________________________________________________________    Pain Level:    0/10  SUBJECTIVE:   Pt continues to report significant improvement in condition. Yesterday, R hip 'was giving me a fit'. \"Once I sit down it feels good pretty quickly'. Pt reports pain in the R anterior hip/groin. No issues walking but getting in/out of the car is painful. \"Forward/backward no problem, but sideways with the R Leg is painful\". Pain has progressively worsened over the past few months but slightly better the past 1-2 weeks. No issues with the LB. Pt states the main complaint is 'slowness' with transfers especially getting in/out of the car. Up/down steps is painful especially with R LE first.  In/out of bed is good with R LE first.  Lifting the R LE is the most painful action. Increased pain with rolling over on the hip. No problems standing. Sitting is ok but sit/stand is painful.  x-rays of the hip (+) mild arthritis. Denies N/T in the LEs. Weakness noted in the R LE.   Feels 'unsteady' with walking and feels it may give out on her. OBJECTIVE:     Exercise/Equipment Resistance/Repetitions Other comments   3 way ball compression   2x30 secs forward  10x5 secs diagonally    TA sets         With march       With KFO    HEP video    Clamshells     HEP    Prone hip extension 10x5 secs B HEP video    Bridging 10x5 secs with green TB   HEP video    DKTC with feet on TB      Clamshells 2     HEP video           Stacking with MB   2x30 secs B   HEP    Rockerboard  x1 min F/B M/L  x1 min rocking each     Lateral stepping with green TB       Steamboats      15ft x8  x15 B     R hip IR/ER neuro re-ed   x2 mins R             S' extension   x15 B maroon TB     Multifidus  x15 B maroon TB     Mini squats with green TB    x15  Emphasis on form   TG with MWM    Manual stretching, wide BEULAH, R leg over L stretching   TG    x6 mins              Other Therapeutic Activities:      Manual Treatments:  Gr 4 LAD with straps, compression, oscillation. Gr 3-4 lateral distraction R hip joint MWM. Femoral MWM B hip flexion. R hip hit technique. R hip IR/ER neuro re-ed resisted training. Prone R hip mobilizations. Manual stretching hip flexion and ER position. Modalities:      Test/Measurements:   Supine R hip flexion  140 degrees    R hip IR  15 degrees   R hip ER  45 degrees      Special Tests: (+) non-relevant R slump test.  Other: AGMR R hip joint. (+) R lesvia test.       ASSESSMENT:   Pt responding well to manual therapy with significant R hip joint mobility. Progressed gluteal and core strengthening today as well as movement training.         Treatment/Activity Tolerance:   [x]Patient tolerated treatment well [] Patient limited by fatique  []Patient limited by pain [] Patient limited by other medical complications  [] Other:     Goals:        Long term goals  Time Frame for Long term goals : 6 weeks  Long term goal 1: Pt independent with HEP  (met)  Long term goal 2: Pt gluteal strength to improve by 1/3 muscle grade (improved)  Long term goal 3: R hip IR/ER ROM to improve by 50%  (met)  Long term goal 4: Pt sit/stand without pain or hip stiffness  (met)  Long term goal 5: Pt return to PLOF (met)    Plan: [x] Continue per plan of care [] Alter current plan (see comments)   [] Plan of care initiated [] Hold pending MD visit [] Discharge      Plan for Next Session:      Re-Certification Due Date:         Signature:  Humphrey Pulido PT

## 2022-02-09 ENCOUNTER — HOSPITAL ENCOUNTER (OUTPATIENT)
Dept: WOMENS IMAGING | Age: 82
Discharge: HOME | End: 2022-02-09
Payer: MEDICARE

## 2022-02-09 VITALS — BODY MASS INDEX: 22.97 KG/M2 | WEIGHT: 117 LBS | HEIGHT: 60 IN

## 2022-02-09 DIAGNOSIS — Z12.31 ENCOUNTER FOR SCREENING MAMMOGRAM FOR BREAST CANCER: ICD-10-CM

## 2022-02-09 PROCEDURE — 77063 BREAST TOMOSYNTHESIS BI: CPT

## 2022-08-22 ENCOUNTER — HOSPITAL ENCOUNTER (OUTPATIENT)
Dept: WOMENS IMAGING | Age: 82
Discharge: HOME | End: 2022-08-22
Payer: MEDICARE

## 2022-08-22 DIAGNOSIS — M81.0 POSTMENOPAUSAL OSTEOPOROSIS: ICD-10-CM

## 2022-08-22 PROCEDURE — 77080 DXA BONE DENSITY AXIAL: CPT

## 2023-02-13 ENCOUNTER — HOSPITAL ENCOUNTER (OUTPATIENT)
Dept: WOMENS IMAGING | Age: 83
Discharge: HOME | End: 2023-02-13
Payer: MEDICARE

## 2023-02-13 VITALS — HEIGHT: 60 IN | BODY MASS INDEX: 22.78 KG/M2 | WEIGHT: 116 LBS

## 2023-02-13 DIAGNOSIS — Z12.31 VISIT FOR SCREENING MAMMOGRAM: ICD-10-CM

## 2023-02-13 PROCEDURE — 77063 BREAST TOMOSYNTHESIS BI: CPT

## 2024-02-15 ENCOUNTER — HOSPITAL ENCOUNTER (OUTPATIENT)
Dept: WOMENS IMAGING | Age: 84
Discharge: HOME | End: 2024-02-15
Payer: MEDICARE

## 2024-02-15 VITALS — WEIGHT: 115 LBS | HEIGHT: 60 IN | BODY MASS INDEX: 22.58 KG/M2

## 2024-02-15 DIAGNOSIS — Z12.31 ENCOUNTER FOR SCREENING MAMMOGRAM FOR MALIGNANT NEOPLASM OF BREAST: ICD-10-CM

## 2024-02-15 PROCEDURE — 77063 BREAST TOMOSYNTHESIS BI: CPT

## 2024-03-20 ENCOUNTER — HOSPITAL ENCOUNTER (OUTPATIENT)
Age: 84
Discharge: HOME | End: 2024-03-20
Payer: MEDICARE

## 2024-03-20 ENCOUNTER — HOSPITAL ENCOUNTER (OUTPATIENT)
Dept: GENERAL RADIOLOGY | Age: 84
Discharge: HOME | End: 2024-03-20
Payer: MEDICARE

## 2024-03-20 DIAGNOSIS — M25.511 RIGHT SHOULDER PAIN, UNSPECIFIED CHRONICITY: ICD-10-CM

## 2024-03-20 DIAGNOSIS — M79.641 RIGHT HAND PAIN: ICD-10-CM

## 2024-03-20 PROCEDURE — 73030 X-RAY EXAM OF SHOULDER: CPT

## 2024-03-20 PROCEDURE — 73130 X-RAY EXAM OF HAND: CPT

## 2025-03-26 ENCOUNTER — HOSPITAL ENCOUNTER (OUTPATIENT)
Dept: WOMENS IMAGING | Age: 85
Discharge: HOME | End: 2025-03-26
Payer: MEDICARE

## 2025-03-26 VITALS — HEIGHT: 60 IN | BODY MASS INDEX: 22.58 KG/M2 | WEIGHT: 115 LBS

## 2025-03-26 DIAGNOSIS — Z12.31 SCREENING MAMMOGRAM FOR BREAST CANCER: ICD-10-CM

## 2025-03-26 PROCEDURE — 77063 BREAST TOMOSYNTHESIS BI: CPT

## 2025-08-30 ENCOUNTER — APPOINTMENT (OUTPATIENT)
Dept: CT IMAGING | Age: 85
DRG: 641 | End: 2025-08-30
Payer: MEDICARE

## 2025-08-30 ENCOUNTER — HOSPITAL ENCOUNTER (INPATIENT)
Age: 85
LOS: 1 days | Discharge: HOME | DRG: 641 | End: 2025-08-31
Attending: EMERGENCY MEDICINE | Admitting: INTERNAL MEDICINE
Payer: MEDICARE

## 2025-08-30 DIAGNOSIS — R55 PRE-SYNCOPE: Primary | ICD-10-CM

## 2025-08-30 DIAGNOSIS — E87.6 HYPOKALEMIA: ICD-10-CM

## 2025-08-30 DIAGNOSIS — E87.1 HYPONATREMIA: ICD-10-CM

## 2025-08-30 DIAGNOSIS — E83.42 HYPOMAGNESEMIA: ICD-10-CM

## 2025-08-30 DIAGNOSIS — E83.39 HYPOPHOSPHATEMIA: ICD-10-CM

## 2025-08-30 LAB
ANION GAP SERPL CALCULATED.3IONS-SCNC: 14 MMOL/L (ref 3–16)
ANION GAP SERPL CALCULATED.3IONS-SCNC: 14 MMOL/L (ref 3–16)
BASOPHILS # BLD: 0.1 K/UL (ref 0–0.2)
BASOPHILS NFR BLD: 0.7 %
BILIRUB UR QL STRIP.AUTO: NEGATIVE
BUN SERPL-MCNC: 12 MG/DL (ref 7–20)
BUN SERPL-MCNC: 12 MG/DL (ref 7–20)
CALCIUM SERPL-MCNC: 9.3 MG/DL (ref 8.3–10.6)
CALCIUM SERPL-MCNC: 9.4 MG/DL (ref 8.3–10.6)
CHLORIDE SERPL-SCNC: 84 MMOL/L (ref 99–110)
CHLORIDE SERPL-SCNC: 85 MMOL/L (ref 99–110)
CLARITY UR: CLEAR
CO2 SERPL-SCNC: 25 MMOL/L (ref 21–32)
CO2 SERPL-SCNC: 26 MMOL/L (ref 21–32)
COLOR UR: ABNORMAL
CREAT SERPL-MCNC: 0.7 MG/DL (ref 0.6–1.2)
CREAT SERPL-MCNC: 0.7 MG/DL (ref 0.6–1.2)
DEPRECATED RDW RBC AUTO: 12.6 % (ref 12.4–15.4)
EOSINOPHIL # BLD: 0.1 K/UL (ref 0–0.6)
EOSINOPHIL NFR BLD: 0.7 %
EPI CELLS #/AREA URNS HPF: NORMAL /HPF (ref 0–5)
GFR SERPLBLD CREATININE-BSD FMLA CKD-EPI: 84 ML/MIN/{1.73_M2}
GFR SERPLBLD CREATININE-BSD FMLA CKD-EPI: 84 ML/MIN/{1.73_M2}
GLUCOSE SERPL-MCNC: 119 MG/DL (ref 70–99)
GLUCOSE SERPL-MCNC: 126 MG/DL (ref 70–99)
GLUCOSE UR STRIP.AUTO-MCNC: NEGATIVE MG/DL
HCT VFR BLD AUTO: 36.1 % (ref 36–48)
HGB BLD-MCNC: 12.8 G/DL (ref 12–16)
HGB UR QL STRIP.AUTO: NEGATIVE
KETONES UR STRIP.AUTO-MCNC: ABNORMAL MG/DL
LEUKOCYTE ESTERASE UR QL STRIP.AUTO: ABNORMAL
LYMPHOCYTES # BLD: 0.8 K/UL (ref 1–5.1)
LYMPHOCYTES NFR BLD: 10.9 %
MAGNESIUM SERPL-MCNC: 1.73 MG/DL (ref 1.8–2.4)
MCH RBC QN AUTO: 31.9 PG (ref 26–34)
MCHC RBC AUTO-ENTMCNC: 35.6 G/DL (ref 31–36)
MCV RBC AUTO: 89.6 FL (ref 80–100)
MONOCYTES # BLD: 0.7 K/UL (ref 0–1.3)
MONOCYTES NFR BLD: 8.8 %
NEUTROPHILS # BLD: 6 K/UL (ref 1.7–7.7)
NEUTROPHILS NFR BLD: 78.9 %
NITRITE UR QL STRIP.AUTO: NEGATIVE
PH UR STRIP.AUTO: 7.5 [PH] (ref 5–8)
PLATELET # BLD AUTO: 247 K/UL (ref 135–450)
PMV BLD AUTO: 8.4 FL (ref 5–10.5)
POTASSIUM SERPL-SCNC: 2.9 MMOL/L (ref 3.5–5.1)
POTASSIUM SERPL-SCNC: 3 MMOL/L (ref 3.5–5.1)
PROT UR STRIP.AUTO-MCNC: NEGATIVE MG/DL
RBC # BLD AUTO: 4.02 M/UL (ref 4–5.2)
RBC #/AREA URNS HPF: NORMAL /HPF (ref 0–4)
SODIUM SERPL-SCNC: 124 MMOL/L (ref 136–145)
SODIUM SERPL-SCNC: 124 MMOL/L (ref 136–145)
SODIUM UR-SCNC: 58 MMOL/L
SP GR UR STRIP.AUTO: <=1.005 (ref 1–1.03)
TROPONIN, HIGH SENSITIVITY: 13 NG/L (ref 0–14)
TROPONIN, HIGH SENSITIVITY: 13 NG/L (ref 0–14)
UA DIPSTICK W REFLEX MICRO PNL UR: YES
URN SPEC COLLECT METH UR: ABNORMAL
UROBILINOGEN UR STRIP-ACNC: 0.2 E.U./DL
WBC # BLD AUTO: 7.6 K/UL (ref 4–11)
WBC #/AREA URNS HPF: NORMAL /HPF (ref 0–5)

## 2025-08-30 PROCEDURE — 36415 COLL VENOUS BLD VENIPUNCTURE: CPT

## 2025-08-30 PROCEDURE — 81001 URINALYSIS AUTO W/SCOPE: CPT

## 2025-08-30 PROCEDURE — 96365 THER/PROPH/DIAG IV INF INIT: CPT

## 2025-08-30 PROCEDURE — 2580000003 HC RX 258: Performed by: EMERGENCY MEDICINE

## 2025-08-30 PROCEDURE — 99285 EMERGENCY DEPT VISIT HI MDM: CPT

## 2025-08-30 PROCEDURE — 6360000002 HC RX W HCPCS: Performed by: INTERNAL MEDICINE

## 2025-08-30 PROCEDURE — 85025 COMPLETE CBC W/AUTO DIFF WBC: CPT

## 2025-08-30 PROCEDURE — 6370000000 HC RX 637 (ALT 250 FOR IP): Performed by: INTERNAL MEDICINE

## 2025-08-30 PROCEDURE — 70450 CT HEAD/BRAIN W/O DYE: CPT

## 2025-08-30 PROCEDURE — 2060000000 HC ICU INTERMEDIATE R&B

## 2025-08-30 PROCEDURE — 84484 ASSAY OF TROPONIN QUANT: CPT

## 2025-08-30 PROCEDURE — 84550 ASSAY OF BLOOD/URIC ACID: CPT

## 2025-08-30 PROCEDURE — 83935 ASSAY OF URINE OSMOLALITY: CPT

## 2025-08-30 PROCEDURE — 83735 ASSAY OF MAGNESIUM: CPT

## 2025-08-30 PROCEDURE — 6360000002 HC RX W HCPCS: Performed by: EMERGENCY MEDICINE

## 2025-08-30 PROCEDURE — 2580000003 HC RX 258: Performed by: INTERNAL MEDICINE

## 2025-08-30 PROCEDURE — 93005 ELECTROCARDIOGRAM TRACING: CPT | Performed by: EMERGENCY MEDICINE

## 2025-08-30 PROCEDURE — 80048 BASIC METABOLIC PNL TOTAL CA: CPT

## 2025-08-30 PROCEDURE — 2500000003 HC RX 250 WO HCPCS: Performed by: INTERNAL MEDICINE

## 2025-08-30 PROCEDURE — 84300 ASSAY OF URINE SODIUM: CPT

## 2025-08-30 RX ORDER — MAGNESIUM SULFATE IN WATER 40 MG/ML
2000 INJECTION, SOLUTION INTRAVENOUS ONCE
Status: COMPLETED | OUTPATIENT
Start: 2025-08-30 | End: 2025-08-30

## 2025-08-30 RX ORDER — ENOXAPARIN SODIUM 100 MG/ML
40 INJECTION SUBCUTANEOUS DAILY
Status: DISCONTINUED | OUTPATIENT
Start: 2025-08-30 | End: 2025-08-31 | Stop reason: HOSPADM

## 2025-08-30 RX ORDER — ONDANSETRON 2 MG/ML
4 INJECTION INTRAMUSCULAR; INTRAVENOUS EVERY 6 HOURS PRN
Status: DISCONTINUED | OUTPATIENT
Start: 2025-08-30 | End: 2025-08-31 | Stop reason: HOSPADM

## 2025-08-30 RX ORDER — SODIUM CHLORIDE AND POTASSIUM CHLORIDE 300; 900 MG/100ML; MG/100ML
INJECTION, SOLUTION INTRAVENOUS CONTINUOUS
Status: DISCONTINUED | OUTPATIENT
Start: 2025-08-30 | End: 2025-08-30

## 2025-08-30 RX ORDER — ACETAMINOPHEN 650 MG/1
650 SUPPOSITORY RECTAL EVERY 6 HOURS PRN
Status: DISCONTINUED | OUTPATIENT
Start: 2025-08-30 | End: 2025-08-31 | Stop reason: HOSPADM

## 2025-08-30 RX ORDER — SODIUM CHLORIDE 0.9 % (FLUSH) 0.9 %
5-40 SYRINGE (ML) INJECTION PRN
Status: DISCONTINUED | OUTPATIENT
Start: 2025-08-30 | End: 2025-08-31 | Stop reason: HOSPADM

## 2025-08-30 RX ORDER — SODIUM CHLORIDE 0.9 % (FLUSH) 0.9 %
5-40 SYRINGE (ML) INJECTION EVERY 12 HOURS SCHEDULED
Status: DISCONTINUED | OUTPATIENT
Start: 2025-08-30 | End: 2025-08-31 | Stop reason: HOSPADM

## 2025-08-30 RX ORDER — SODIUM CHLORIDE 9 MG/ML
INJECTION, SOLUTION INTRAVENOUS PRN
Status: DISCONTINUED | OUTPATIENT
Start: 2025-08-30 | End: 2025-08-31 | Stop reason: HOSPADM

## 2025-08-30 RX ORDER — MAGNESIUM SULFATE IN WATER 40 MG/ML
2000 INJECTION, SOLUTION INTRAVENOUS PRN
Status: DISCONTINUED | OUTPATIENT
Start: 2025-08-30 | End: 2025-08-31 | Stop reason: HOSPADM

## 2025-08-30 RX ORDER — LANOLIN ALCOHOL/MO/W.PET/CERES
400 CREAM (GRAM) TOPICAL DAILY
Status: DISCONTINUED | OUTPATIENT
Start: 2025-08-30 | End: 2025-08-31

## 2025-08-30 RX ORDER — AMLODIPINE BESYLATE 5 MG/1
5 TABLET ORAL DAILY
COMMUNITY
Start: 2025-08-18

## 2025-08-30 RX ORDER — AMLODIPINE BESYLATE 5 MG/1
5 TABLET ORAL DAILY
Status: DISCONTINUED | OUTPATIENT
Start: 2025-08-31 | End: 2025-08-31 | Stop reason: HOSPADM

## 2025-08-30 RX ORDER — POTASSIUM CHLORIDE 1500 MG/1
40 TABLET, EXTENDED RELEASE ORAL PRN
Status: DISCONTINUED | OUTPATIENT
Start: 2025-08-30 | End: 2025-08-31 | Stop reason: HOSPADM

## 2025-08-30 RX ORDER — POTASSIUM CHLORIDE 1500 MG/1
40 TABLET, EXTENDED RELEASE ORAL ONCE
Status: DISCONTINUED | OUTPATIENT
Start: 2025-08-30 | End: 2025-08-31

## 2025-08-30 RX ORDER — 0.9 % SODIUM CHLORIDE 0.9 %
500 INTRAVENOUS SOLUTION INTRAVENOUS ONCE
Status: COMPLETED | OUTPATIENT
Start: 2025-08-30 | End: 2025-08-30

## 2025-08-30 RX ORDER — ACETAMINOPHEN 325 MG/1
650 TABLET ORAL EVERY 6 HOURS PRN
Status: DISCONTINUED | OUTPATIENT
Start: 2025-08-30 | End: 2025-08-31 | Stop reason: HOSPADM

## 2025-08-30 RX ORDER — ONDANSETRON 4 MG/1
4 TABLET, ORALLY DISINTEGRATING ORAL EVERY 8 HOURS PRN
Status: DISCONTINUED | OUTPATIENT
Start: 2025-08-30 | End: 2025-08-31 | Stop reason: HOSPADM

## 2025-08-30 RX ORDER — POTASSIUM CHLORIDE 7.45 MG/ML
10 INJECTION INTRAVENOUS PRN
Status: DISCONTINUED | OUTPATIENT
Start: 2025-08-30 | End: 2025-08-31 | Stop reason: HOSPADM

## 2025-08-30 RX ORDER — ATENOLOL 25 MG/1
25 TABLET ORAL DAILY
Status: DISCONTINUED | OUTPATIENT
Start: 2025-08-31 | End: 2025-08-31 | Stop reason: HOSPADM

## 2025-08-30 RX ORDER — POLYETHYLENE GLYCOL 3350 17 G/17G
17 POWDER, FOR SOLUTION ORAL DAILY PRN
Status: DISCONTINUED | OUTPATIENT
Start: 2025-08-30 | End: 2025-08-31 | Stop reason: HOSPADM

## 2025-08-30 RX ADMIN — POTASSIUM CHLORIDE 10 MEQ: 7.46 INJECTION, SOLUTION INTRAVENOUS at 22:51

## 2025-08-30 RX ADMIN — POTASSIUM CHLORIDE 10 MEQ: 7.46 INJECTION, SOLUTION INTRAVENOUS at 19:02

## 2025-08-30 RX ADMIN — POTASSIUM CHLORIDE 10 MEQ: 7.46 INJECTION, SOLUTION INTRAVENOUS at 20:14

## 2025-08-30 RX ADMIN — Medication 400 MG: at 19:04

## 2025-08-30 RX ADMIN — Medication 10 ML: at 18:58

## 2025-08-30 RX ADMIN — MAGNESIUM SULFATE HEPTAHYDRATE 2000 MG: 40 INJECTION, SOLUTION INTRAVENOUS at 19:04

## 2025-08-30 RX ADMIN — Medication 10 ML: at 21:44

## 2025-08-30 RX ADMIN — SODIUM CHLORIDE: 0.9 INJECTION, SOLUTION INTRAVENOUS at 19:01

## 2025-08-30 RX ADMIN — SODIUM CHLORIDE AND POTASSIUM CHLORIDE: 9; 2.98 INJECTION, SOLUTION INTRAVENOUS at 15:50

## 2025-08-30 RX ADMIN — POTASSIUM CHLORIDE 10 MEQ: 7.46 INJECTION, SOLUTION INTRAVENOUS at 21:30

## 2025-08-30 RX ADMIN — SODIUM CHLORIDE 500 ML: 0.9 INJECTION, SOLUTION INTRAVENOUS at 15:50

## 2025-08-30 ASSESSMENT — LIFESTYLE VARIABLES
HOW OFTEN DO YOU HAVE A DRINK CONTAINING ALCOHOL: NEVER
HOW MANY STANDARD DRINKS CONTAINING ALCOHOL DO YOU HAVE ON A TYPICAL DAY: PATIENT DOES NOT DRINK

## 2025-08-30 ASSESSMENT — PAIN SCALES - GENERAL: PAINLEVEL_OUTOF10: 0

## 2025-08-30 ASSESSMENT — PAIN - FUNCTIONAL ASSESSMENT: PAIN_FUNCTIONAL_ASSESSMENT: 0-10

## 2025-08-31 VITALS
HEART RATE: 62 BPM | DIASTOLIC BLOOD PRESSURE: 42 MMHG | OXYGEN SATURATION: 100 % | BODY MASS INDEX: 22.32 KG/M2 | TEMPERATURE: 97 F | SYSTOLIC BLOOD PRESSURE: 126 MMHG | RESPIRATION RATE: 16 BRPM | HEIGHT: 60 IN | WEIGHT: 113.7 LBS

## 2025-08-31 LAB
ANION GAP SERPL CALCULATED.3IONS-SCNC: 11 MMOL/L (ref 3–16)
ANION GAP SERPL CALCULATED.3IONS-SCNC: 11 MMOL/L (ref 3–16)
ANION GAP SERPL CALCULATED.3IONS-SCNC: 12 MMOL/L (ref 3–16)
BASOPHILS # BLD: 0.1 K/UL (ref 0–0.2)
BASOPHILS NFR BLD: 0.9 %
BUN SERPL-MCNC: 10 MG/DL (ref 7–20)
BUN SERPL-MCNC: 11 MG/DL (ref 7–20)
BUN SERPL-MCNC: 9 MG/DL (ref 7–20)
CALCIUM SERPL-MCNC: 9.5 MG/DL (ref 8.3–10.6)
CALCIUM SERPL-MCNC: 9.6 MG/DL (ref 8.3–10.6)
CALCIUM SERPL-MCNC: 9.7 MG/DL (ref 8.3–10.6)
CHLORIDE SERPL-SCNC: 90 MMOL/L (ref 99–110)
CHLORIDE SERPL-SCNC: 91 MMOL/L (ref 99–110)
CHLORIDE SERPL-SCNC: 95 MMOL/L (ref 99–110)
CO2 SERPL-SCNC: 26 MMOL/L (ref 21–32)
CO2 SERPL-SCNC: 28 MMOL/L (ref 21–32)
CO2 SERPL-SCNC: 28 MMOL/L (ref 21–32)
CREAT SERPL-MCNC: 0.6 MG/DL (ref 0.6–1.2)
DEPRECATED RDW RBC AUTO: 12.5 % (ref 12.4–15.4)
EKG ATRIAL RATE: 65 BPM
EKG DIAGNOSIS: NORMAL
EKG P AXIS: 79 DEGREES
EKG P-R INTERVAL: 162 MS
EKG Q-T INTERVAL: 454 MS
EKG QRS DURATION: 86 MS
EKG QTC CALCULATION (BAZETT): 472 MS
EKG R AXIS: 30 DEGREES
EKG T AXIS: 70 DEGREES
EKG VENTRICULAR RATE: 65 BPM
EOSINOPHIL # BLD: 0.2 K/UL (ref 0–0.6)
EOSINOPHIL NFR BLD: 2.3 %
GFR SERPLBLD CREATININE-BSD FMLA CKD-EPI: 87 ML/MIN/{1.73_M2}
GLUCOSE SERPL-MCNC: 100 MG/DL (ref 70–99)
GLUCOSE SERPL-MCNC: 104 MG/DL (ref 70–99)
GLUCOSE SERPL-MCNC: 83 MG/DL (ref 70–99)
HCT VFR BLD AUTO: 37.7 % (ref 36–48)
HGB BLD-MCNC: 13.4 G/DL (ref 12–16)
LYMPHOCYTES # BLD: 1.3 K/UL (ref 1–5.1)
LYMPHOCYTES NFR BLD: 19.1 %
MAGNESIUM SERPL-MCNC: 2.59 MG/DL (ref 1.8–2.4)
MCH RBC QN AUTO: 32 PG (ref 26–34)
MCHC RBC AUTO-ENTMCNC: 35.4 G/DL (ref 31–36)
MCV RBC AUTO: 90.3 FL (ref 80–100)
MONOCYTES # BLD: 0.8 K/UL (ref 0–1.3)
MONOCYTES NFR BLD: 11.5 %
NEUTROPHILS # BLD: 4.5 K/UL (ref 1.7–7.7)
NEUTROPHILS NFR BLD: 66.2 %
OSMOLALITY SERPL: 282 MOSM/KG (ref 280–301)
OSMOLALITY UR: 160 MOSM/KG (ref 390–1070)
PHOSPHATE SERPL-MCNC: 2.2 MG/DL (ref 2.5–4.9)
PLATELET # BLD AUTO: 242 K/UL (ref 135–450)
PMV BLD AUTO: 8.7 FL (ref 5–10.5)
POTASSIUM SERPL-SCNC: 3.6 MMOL/L (ref 3.5–5.1)
POTASSIUM SERPL-SCNC: 3.6 MMOL/L (ref 3.5–5.1)
POTASSIUM SERPL-SCNC: 3.7 MMOL/L (ref 3.5–5.1)
RBC # BLD AUTO: 4.18 M/UL (ref 4–5.2)
SODIUM SERPL-SCNC: 130 MMOL/L (ref 136–145)
SODIUM SERPL-SCNC: 130 MMOL/L (ref 136–145)
SODIUM SERPL-SCNC: 132 MMOL/L (ref 136–145)
TSH SERPL DL<=0.005 MIU/L-ACNC: 2.8 UIU/ML (ref 0.27–4.2)
URATE SERPL-MCNC: 3.8 MG/DL (ref 2.6–6)
WBC # BLD AUTO: 6.7 K/UL (ref 4–11)

## 2025-08-31 PROCEDURE — 83735 ASSAY OF MAGNESIUM: CPT

## 2025-08-31 PROCEDURE — 2500000003 HC RX 250 WO HCPCS: Performed by: INTERNAL MEDICINE

## 2025-08-31 PROCEDURE — 6360000002 HC RX W HCPCS: Performed by: INTERNAL MEDICINE

## 2025-08-31 PROCEDURE — 93010 ELECTROCARDIOGRAM REPORT: CPT | Performed by: INTERNAL MEDICINE

## 2025-08-31 PROCEDURE — 6370000000 HC RX 637 (ALT 250 FOR IP): Performed by: INTERNAL MEDICINE

## 2025-08-31 PROCEDURE — 36415 COLL VENOUS BLD VENIPUNCTURE: CPT

## 2025-08-31 PROCEDURE — 83930 ASSAY OF BLOOD OSMOLALITY: CPT

## 2025-08-31 PROCEDURE — 84100 ASSAY OF PHOSPHORUS: CPT

## 2025-08-31 PROCEDURE — 80048 BASIC METABOLIC PNL TOTAL CA: CPT

## 2025-08-31 PROCEDURE — 85025 COMPLETE CBC W/AUTO DIFF WBC: CPT

## 2025-08-31 PROCEDURE — 84443 ASSAY THYROID STIM HORMONE: CPT

## 2025-08-31 RX ORDER — POTASSIUM CHLORIDE 1500 MG/1
40 TABLET, EXTENDED RELEASE ORAL ONCE
Status: COMPLETED | OUTPATIENT
Start: 2025-08-31 | End: 2025-08-31

## 2025-08-31 RX ADMIN — Medication 10 ML: at 10:02

## 2025-08-31 RX ADMIN — AMLODIPINE BESYLATE 5 MG: 5 TABLET ORAL at 10:02

## 2025-08-31 RX ADMIN — POTASSIUM CHLORIDE 10 MEQ: 7.46 INJECTION, SOLUTION INTRAVENOUS at 00:09

## 2025-08-31 RX ADMIN — ATENOLOL 25 MG: 25 TABLET ORAL at 10:02

## 2025-08-31 RX ADMIN — ENOXAPARIN SODIUM 40 MG: 100 INJECTION SUBCUTANEOUS at 10:02

## 2025-08-31 RX ADMIN — Medication 1 TABLET: at 10:02

## 2025-08-31 RX ADMIN — POTASSIUM CHLORIDE 40 MEQ: 1500 TABLET, EXTENDED RELEASE ORAL at 10:02

## 2025-08-31 ASSESSMENT — PAIN SCALES - GENERAL
PAINLEVEL_OUTOF10: 0
PAINLEVEL_OUTOF10: 0